# Patient Record
Sex: FEMALE | Race: WHITE | NOT HISPANIC OR LATINO | Employment: UNEMPLOYED | URBAN - METROPOLITAN AREA
[De-identification: names, ages, dates, MRNs, and addresses within clinical notes are randomized per-mention and may not be internally consistent; named-entity substitution may affect disease eponyms.]

---

## 2018-03-09 ENCOUNTER — HOSPITAL ENCOUNTER (INPATIENT)
Facility: HOSPITAL | Age: 18
LOS: 6 days | Discharge: HOME | DRG: 753 | End: 2018-03-15
Attending: PSYCHIATRY & NEUROLOGY | Admitting: PSYCHIATRY & NEUROLOGY
Payer: COMMERCIAL

## 2018-03-09 DIAGNOSIS — F31.32 BIPOLAR AFFECTIVE DISORDER, CURRENTLY DEPRESSED, MODERATE (HCC): ICD-10-CM

## 2018-03-09 DIAGNOSIS — F41.1 GENERALIZED ANXIETY DISORDER: ICD-10-CM

## 2018-03-09 DIAGNOSIS — F31.9 BIPOLAR DISORDER (HCC): Primary | ICD-10-CM

## 2018-03-09 LAB
AMPHETAMINES SERPL QL SCN: NEGATIVE
ATRIAL RATE: 76 BPM
BACTERIA UR QL AUTO: ABNORMAL /HPF
BARBITURATES UR QL: NEGATIVE
BENZODIAZ UR QL: NEGATIVE
BILIRUB UR QL STRIP: NEGATIVE
CLARITY UR: ABNORMAL
COCAINE UR QL: NEGATIVE
COLOR UR: YELLOW
GLUCOSE UR STRIP-MCNC: NEGATIVE MG/DL
HGB UR QL STRIP.AUTO: NEGATIVE
KETONES UR STRIP-MCNC: NEGATIVE MG/DL
LEUKOCYTE ESTERASE UR QL STRIP: ABNORMAL
METHADONE UR QL: NEGATIVE
NITRITE UR QL STRIP: NEGATIVE
NON-SQ EPI CELLS URNS QL MICRO: ABNORMAL /HPF
OPIATES UR QL SCN: NEGATIVE
P AXIS: 23 DEGREES
PCP UR QL: NEGATIVE
PH UR STRIP.AUTO: 6 [PH] (ref 4.5–8)
PR INTERVAL: 114 MS
PROT UR STRIP-MCNC: NEGATIVE MG/DL
QRS AXIS: 70 DEGREES
QRSD INTERVAL: 84 MS
QT INTERVAL: 376 MS
QTC INTERVAL: 423 MS
RBC #/AREA URNS AUTO: ABNORMAL /HPF
SP GR UR STRIP.AUTO: 1.02 (ref 1–1.03)
T WAVE AXIS: 46 DEGREES
THC UR QL: NEGATIVE
UROBILINOGEN UR QL STRIP.AUTO: 0.2 E.U./DL
VENTRICULAR RATE: 76 BPM
WBC #/AREA URNS AUTO: ABNORMAL /HPF

## 2018-03-09 PROCEDURE — 93005 ELECTROCARDIOGRAM TRACING: CPT | Performed by: PSYCHIATRY & NEUROLOGY

## 2018-03-09 PROCEDURE — 81001 URINALYSIS AUTO W/SCOPE: CPT | Performed by: PSYCHIATRY & NEUROLOGY

## 2018-03-09 PROCEDURE — 99254 IP/OBS CNSLTJ NEW/EST MOD 60: CPT | Performed by: PHYSICIAN ASSISTANT

## 2018-03-09 PROCEDURE — 80307 DRUG TEST PRSMV CHEM ANLYZR: CPT | Performed by: PSYCHIATRY & NEUROLOGY

## 2018-03-09 PROCEDURE — 99223 1ST HOSP IP/OBS HIGH 75: CPT | Performed by: PSYCHIATRY & NEUROLOGY

## 2018-03-09 RX ORDER — LITHIUM CARBONATE 600 MG/1
600 CAPSULE ORAL DAILY
Status: ON HOLD | COMMUNITY
End: 2018-03-15

## 2018-03-09 RX ORDER — OLANZAPINE 10 MG/1
5 INJECTION, POWDER, LYOPHILIZED, FOR SOLUTION INTRAMUSCULAR EVERY 8 HOURS PRN
Status: DISCONTINUED | OUTPATIENT
Start: 2018-03-09 | End: 2018-03-15 | Stop reason: HOSPADM

## 2018-03-09 RX ORDER — HALOPERIDOL 5 MG/ML
5 INJECTION INTRAMUSCULAR EVERY 6 HOURS PRN
Status: DISCONTINUED | OUTPATIENT
Start: 2018-03-09 | End: 2018-03-15 | Stop reason: HOSPADM

## 2018-03-09 RX ORDER — LORAZEPAM 2 MG/ML
2 INJECTION INTRAMUSCULAR EVERY 6 HOURS PRN
Status: DISCONTINUED | OUTPATIENT
Start: 2018-03-09 | End: 2018-03-15 | Stop reason: HOSPADM

## 2018-03-09 RX ORDER — HALOPERIDOL 5 MG
5 TABLET ORAL EVERY 8 HOURS PRN
Status: DISCONTINUED | OUTPATIENT
Start: 2018-03-09 | End: 2018-03-15 | Stop reason: HOSPADM

## 2018-03-09 RX ORDER — BUSPIRONE HYDROCHLORIDE 10 MG/1
10 TABLET ORAL 2 TIMES DAILY
COMMUNITY
End: 2018-03-15 | Stop reason: HOSPADM

## 2018-03-09 RX ORDER — LITHIUM CARBONATE 300 MG/1
600 CAPSULE ORAL
Status: DISCONTINUED | OUTPATIENT
Start: 2018-03-09 | End: 2018-03-12

## 2018-03-09 RX ORDER — MAGNESIUM HYDROXIDE/ALUMINUM HYDROXICE/SIMETHICONE 120; 1200; 1200 MG/30ML; MG/30ML; MG/30ML
30 SUSPENSION ORAL EVERY 4 HOURS PRN
Status: DISCONTINUED | OUTPATIENT
Start: 2018-03-09 | End: 2018-03-15 | Stop reason: HOSPADM

## 2018-03-09 RX ORDER — VENLAFAXINE HYDROCHLORIDE 75 MG/1
75 CAPSULE, EXTENDED RELEASE ORAL DAILY
Status: ON HOLD | COMMUNITY
End: 2018-03-15

## 2018-03-09 RX ORDER — BENZTROPINE MESYLATE 1 MG/1
1 TABLET ORAL EVERY 6 HOURS PRN
Status: DISCONTINUED | OUTPATIENT
Start: 2018-03-09 | End: 2018-03-15 | Stop reason: HOSPADM

## 2018-03-09 RX ORDER — DIAPER,BRIEF,INFANT-TODD,DISP
EACH MISCELLANEOUS 4 TIMES DAILY PRN
Status: DISCONTINUED | OUTPATIENT
Start: 2018-03-09 | End: 2018-03-15 | Stop reason: HOSPADM

## 2018-03-09 RX ORDER — OLANZAPINE 5 MG/1
5 TABLET ORAL EVERY 8 HOURS PRN
Status: DISCONTINUED | OUTPATIENT
Start: 2018-03-09 | End: 2018-03-15 | Stop reason: HOSPADM

## 2018-03-09 RX ORDER — VENLAFAXINE HYDROCHLORIDE 37.5 MG/1
37.5 CAPSULE, EXTENDED RELEASE ORAL DAILY
Status: DISCONTINUED | OUTPATIENT
Start: 2018-03-09 | End: 2018-03-10

## 2018-03-09 RX ORDER — HYDROXYZINE 50 MG/1
50 TABLET, FILM COATED ORAL EVERY 8 HOURS PRN
Status: DISCONTINUED | OUTPATIENT
Start: 2018-03-09 | End: 2018-03-15 | Stop reason: HOSPADM

## 2018-03-09 RX ORDER — LORAZEPAM 1 MG/1
1 TABLET ORAL EVERY 8 HOURS PRN
Status: DISCONTINUED | OUTPATIENT
Start: 2018-03-09 | End: 2018-03-15 | Stop reason: HOSPADM

## 2018-03-09 RX ORDER — ACETAMINOPHEN 325 MG/1
650 TABLET ORAL EVERY 6 HOURS PRN
Status: DISCONTINUED | OUTPATIENT
Start: 2018-03-09 | End: 2018-03-15 | Stop reason: HOSPADM

## 2018-03-09 RX ORDER — BENZTROPINE MESYLATE 1 MG/ML
1 INJECTION INTRAMUSCULAR; INTRAVENOUS EVERY 6 HOURS PRN
Status: DISCONTINUED | OUTPATIENT
Start: 2018-03-09 | End: 2018-03-15 | Stop reason: HOSPADM

## 2018-03-09 RX ORDER — ZOLPIDEM TARTRATE 5 MG/1
5 TABLET ORAL
Status: DISCONTINUED | OUTPATIENT
Start: 2018-03-09 | End: 2018-03-15 | Stop reason: HOSPADM

## 2018-03-09 RX ADMIN — VENLAFAXINE HYDROCHLORIDE 37.5 MG: 37.5 CAPSULE, EXTENDED RELEASE ORAL at 12:41

## 2018-03-09 RX ADMIN — HYDROXYZINE HYDROCHLORIDE 50 MG: 50 TABLET, FILM COATED ORAL at 12:44

## 2018-03-09 RX ADMIN — LITHIUM CARBONATE 600 MG: 300 CAPSULE, GELATIN COATED ORAL at 18:00

## 2018-03-09 NOTE — PROGRESS NOTES
Pt complaining of severe itching on her back  Entire back is reddened and irritated with scratch marks noted  Pt states it was from the trazadone overdose  Atarax had been given for anxiety at 1244, did not provide relief from itching  CHRIST Bradford Mediate aware and order for hydrocortisone cream noted  EKG obtained
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
## 2018-03-09 NOTE — TREATMENT PLAN
TREATMENT PLAN REVIEW - [id number] [address] [address] 21 y o  2000 female MRN: [id number]    [address] [place] [address] Room / Bed: [facility] 263/[facility] 546-86 Encounter: [id number]          Admit Date/Time:  3/9/2018 11:02 AM    Treatment Team: Attending Provider: Jody Kawasaki; Medications RN: Riky Burch RN    Diagnosis: Principal Problem:    Bipolar affective disorder, currently depressed, moderate ([address] )  Active Problems:    Generalized anxiety disorder    Patient Strengths/Assets: cooperative, general fund of knowledge, good past treatment response, motivation for treatment/growth, patient is on a voluntary commitment    Patient Barriers/Limitations: low self esteem; non compliance with medications    Short Term Goals: decrease in depressive symptoms, decrease in anxiety symptoms, decrease in suicidal thoughts    Long Term Goals: improvement in depression, improvement in anxiety, stabilization of mood, free of suicidal thoughts, acceptance of need for psychiatric medications, acceptance of need for psychiatric treatment, acceptance of need for psychiatric follow up after discharge    Progress Towards Goals: starting psychiatric medications as prescribed    Recommended Treatment: medication management, patient medication education, group therapy, milieu therapy, continued Behavioral Health psychiatric evaluation/assessment process    Treatment Frequency: daily medication monitoring, group and milieu therapy daily, monitoring through interdisciplinary rounds, monitoring through weekly patient care conferences    Expected Discharge Date: 5-7 days    Discharge Plan: referral for outpatient medication management with a psychiatrist, referral for outpatient psychotherapy    Treatment Plan Created/Updated By: Jody Kawasaki

## 2018-03-09 NOTE — CONSULTS
Consultation - Cayla Cordova 21 y o  female MRN: [id number]    Unit/Bed#: [facility] 263-01 Encounter: [id number]      Assessment/Plan     Assessment/Plan  1  Bipolar disorder - currently depressed   -medically cleared for psychiatric evaluation and treatment  2  Diffuse erythematous rash posterior trunk   -Hydrocortisone cream    History of Present Illness   HPI: Stephanie Coreas is a 21y o  year old female who presents with depression without suicidal ideation or plan  She has a history of self injury with superficial cuts to the wrist  She reports she started with a rash on her back since last week  She denies recent or chronic illness, denies fevers, chills, cp, sob, nausea, vomiting, diarrhea, cough  She does admit to fatigue and reports it is associated with a history of anemia and inability to sleep at night  Inpatient consult for Medical Clearance for [facility] patient  Consult performed by: Kelsy Wong  Consult ordered by: Rc Bustillos          Review of Systems   Constitutional: Positive for chills and fatigue (with hx anemia)  HENT: Negative  Eyes: Negative  Respiratory: Negative  Cardiovascular: Negative  Gastrointestinal: Negative  Genitourinary: Negative  Musculoskeletal: Negative  Skin: Negative  Allergic/Immunologic: Negative  Neurological: Negative  Hematological: Negative  Psychiatric/Behavioral: Positive for dysphoric mood and self-injury  The patient is nervous/anxious          Historical Information   Past Medical History:   Diagnosis Date    Anxiety     Bipolar disorder ([facility] [address] )     Borderline personality disorder     Depression     Panic attack     Psychiatric illness     Many hospitalizaations [address] psych    PTSD (post-traumatic stress disorder)     Self-injurious behavior     Cuts self on arms and upper thighs  last time 2 weeks ago    Sleep difficulties     sleeps very little in day and night time    Suicide attempt      No past surgical history on file  Social History   History   Alcohol use Not on file     History   Drug use: Unknown     History   Smoking Status    Not on file   Smokeless Tobacco    Not on file     Family History: non-contributory    Meds/Allergies   PTA meds:   Prior to Admission Medications   Prescriptions Last Dose Informant Patient Reported? Taking?   busPIRone (BUSPAR) 10 mg tablet  Self Yes Yes   Sig: Take 10 mg by mouth 2 (two) times a day   lithium 600 MG capsule  Self Yes Yes   Sig: Take 600 mg by mouth daily   venlafaxine (EFFEXOR-XR) 75 mg 24 hr capsule  Self Yes Yes   Sig: Take 75 mg by mouth daily      Facility-Administered Medications: None     Allergies no known allergies    Objective   Vitals: Blood pressure 121/79, pulse 91, temperature (!) 97 4 °F (36 3 °C), temperature source Temporal, resp  rate 18, height 5' 1" (1 549 m), weight 45 4 kg (100 lb 1 6 oz), SpO2 98 %  No intake or output data in the 24 hours ending 03/09/18 1410  Invasive Devices          No matching active lines, drains, or airways          Physical Exam   Constitutional: She is oriented to person, place, and time  She appears well-developed and well-nourished  HENT:   Head: Normocephalic and atraumatic  Eyes: EOM are normal  Pupils are equal, round, and reactive to light  Neck: Normal range of motion  Cardiovascular: Normal rate and regular rhythm  Exam reveals no gallop and no friction rub  No murmur heard  Pulmonary/Chest: Effort normal  She has no wheezes  She has no rales  Abdominal: Soft  There is no tenderness  There is no guarding  Neurological: She is alert and oriented to person, place, and time  Skin: Skin is warm  There is erythema (back, with scratch marks present)           Code Status: Level 1 - Full Code
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
## 2018-03-09 NOTE — CASE MANAGEMENT
CM spoke to pt's mother Troy Avitia by telephone ([phone]) who advised CM that pt has had numerous hospitalizations  Hortensia Monteiro was unable to provide a contact number for Evelyn Rao and [facility] who she is presently living with  Hortensia Monteiro mentioned that pt was enrolled in [facility] in [place] but did not feel comfortable there as she felt like she was being bullied  CM will continue to follow

## 2018-03-10 LAB
ALBUMIN SERPL BCP-MCNC: 2.7 G/DL (ref 3.5–5)
ALP SERPL-CCNC: 40 U/L (ref 46–384)
ALT SERPL W P-5'-P-CCNC: 17 U/L (ref 12–78)
ANION GAP SERPL CALCULATED.3IONS-SCNC: 6 MMOL/L (ref 4–13)
AST SERPL W P-5'-P-CCNC: 15 U/L (ref 5–45)
BASOPHILS # BLD AUTO: 0.04 THOUSANDS/ΜL (ref 0–0.1)
BASOPHILS NFR BLD AUTO: 1 % (ref 0–1)
BILIRUB SERPL-MCNC: 0.2 MG/DL (ref 0.2–1)
BUN SERPL-MCNC: 13 MG/DL (ref 5–25)
CALCIUM SERPL-MCNC: 7.5 MG/DL (ref 8.3–10.1)
CHLORIDE SERPL-SCNC: 106 MMOL/L (ref 100–108)
CHOLEST SERPL-MCNC: 153 MG/DL (ref 50–200)
CO2 SERPL-SCNC: 28 MMOL/L (ref 21–32)
CREAT SERPL-MCNC: 0.78 MG/DL (ref 0.6–1.3)
EOSINOPHIL # BLD AUTO: 0.19 THOUSAND/ΜL (ref 0–0.61)
EOSINOPHIL NFR BLD AUTO: 3 % (ref 0–6)
ERYTHROCYTE [DISTWIDTH] IN BLOOD BY AUTOMATED COUNT: 12.4 % (ref 11.6–15.1)
GFR SERPL CREATININE-BSD FRML MDRD: 111 ML/MIN/1.73SQ M
GLUCOSE P FAST SERPL-MCNC: 88 MG/DL (ref 65–99)
GLUCOSE SERPL-MCNC: 88 MG/DL (ref 65–140)
HCG SERPL QL: NEGATIVE
HCT VFR BLD AUTO: 37 % (ref 34.8–46.1)
HDLC SERPL-MCNC: 53 MG/DL (ref 40–60)
HGB BLD-MCNC: 12.1 G/DL (ref 11.5–15.4)
LDLC SERPL CALC-MCNC: 88 MG/DL (ref 0–100)
LYMPHOCYTES # BLD AUTO: 2.25 THOUSANDS/ΜL (ref 0.6–4.47)
LYMPHOCYTES NFR BLD AUTO: 29 % (ref 14–44)
MCH RBC QN AUTO: 31.1 PG (ref 26.8–34.3)
MCHC RBC AUTO-ENTMCNC: 32.7 G/DL (ref 31.4–37.4)
MCV RBC AUTO: 95 FL (ref 82–98)
MONOCYTES # BLD AUTO: 0.7 THOUSAND/ΜL (ref 0.17–1.22)
MONOCYTES NFR BLD AUTO: 9 % (ref 4–12)
NEUTROPHILS # BLD AUTO: 4.47 THOUSANDS/ΜL (ref 1.85–7.62)
NEUTS SEG NFR BLD AUTO: 58 % (ref 43–75)
PLATELET # BLD AUTO: 270 THOUSANDS/UL (ref 149–390)
PMV BLD AUTO: 9.2 FL (ref 8.9–12.7)
POTASSIUM SERPL-SCNC: 4.1 MMOL/L (ref 3.5–5.3)
PROT SERPL-MCNC: 5.5 G/DL (ref 6.4–8.2)
RBC # BLD AUTO: 3.89 MILLION/UL (ref 3.81–5.12)
SODIUM SERPL-SCNC: 140 MMOL/L (ref 136–145)
TRIGL SERPL-MCNC: 61 MG/DL
TSH SERPL DL<=0.05 MIU/L-ACNC: 2.6 UIU/ML (ref 0.46–3.98)
WBC # BLD AUTO: 7.65 THOUSAND/UL (ref 4.31–10.16)

## 2018-03-10 PROCEDURE — 86592 SYPHILIS TEST NON-TREP QUAL: CPT | Performed by: PSYCHIATRY & NEUROLOGY

## 2018-03-10 PROCEDURE — 84443 ASSAY THYROID STIM HORMONE: CPT | Performed by: PSYCHIATRY & NEUROLOGY

## 2018-03-10 PROCEDURE — 80061 LIPID PANEL: CPT | Performed by: PSYCHIATRY & NEUROLOGY

## 2018-03-10 PROCEDURE — 80053 COMPREHEN METABOLIC PANEL: CPT | Performed by: PSYCHIATRY & NEUROLOGY

## 2018-03-10 PROCEDURE — 85025 COMPLETE CBC W/AUTO DIFF WBC: CPT | Performed by: PSYCHIATRY & NEUROLOGY

## 2018-03-10 PROCEDURE — 84703 CHORIONIC GONADOTROPIN ASSAY: CPT | Performed by: PSYCHIATRY & NEUROLOGY

## 2018-03-10 PROCEDURE — 99232 SBSQ HOSP IP/OBS MODERATE 35: CPT | Performed by: PSYCHIATRY & NEUROLOGY

## 2018-03-10 RX ORDER — VENLAFAXINE HYDROCHLORIDE 75 MG/1
75 CAPSULE, EXTENDED RELEASE ORAL DAILY
Status: DISCONTINUED | OUTPATIENT
Start: 2018-03-11 | End: 2018-03-15 | Stop reason: HOSPADM

## 2018-03-10 RX ADMIN — LITHIUM CARBONATE 600 MG: 300 CAPSULE, GELATIN COATED ORAL at 17:24

## 2018-03-10 RX ADMIN — VENLAFAXINE HYDROCHLORIDE 37.5 MG: 37.5 CAPSULE, EXTENDED RELEASE ORAL at 09:31

## 2018-03-10 RX ADMIN — HYDROCORTISONE 1 APPLICATION: 1 CREAM TOPICAL at 14:09

## 2018-03-10 NOTE — PROGRESS NOTES
Generally seclusive to room & difficult to engage upon approach  Did attempt to attend group, but left before it was over  Denies SI/HI & A/V Hallucinations  Rates her depression at a 2 & anxiety at a 4  Denies urges to cut  Denies questions/concerns when asked  Did go to group, but was unable to stay for the duration  No other changes in status

## 2018-03-10 NOTE — PROGRESS NOTES
Progress Note - Behavioral Health   Chari Cordova 21 y o  female MRN: [id number]  Unit/Bed#: [facility] 263-01 Encounter: [id number]    Assessment/Plan   Principal Problem:    Bipolar affective disorder, currently depressed, moderate ([address] )  Active Problems:    Generalized anxiety disorder      Subjective: Patient remained complaint with medications  Remained depressed with anger and suicidal ideations  She is consenting for safety on unit  No physical symptoms reported  Patient is interested in partial outpatient program and DBT on discharge  She is seen out in milieu attending groups  Current Medications:    Current Facility-Administered Medications:  acetaminophen 650 mg Oral Q6H PRN Conner Feldman   aluminum-magnesium hydroxide-simethicone 30 mL Oral Q4H PRN Conner Feldman   benztropine 1 mg Intramuscular Q6H PRN Conner Feldman   benztropine 1 mg Oral Q6H PRN Conner Feldman   haloperidol 5 mg Oral Q8H PRN Conner Feldman   haloperidol lactate 5 mg Intramuscular Q6H PRN Conner Feldman   hydrocortisone  Topical 4x Daily PRN Lorenza Ramirez PA-C   hydrOXYzine HCL 50 mg Oral Q8H PRN Conner Feldman   lithium carbonate 600 mg Oral After [employer]   LORazepam 2 mg Intramuscular Q6H PRN Conner Feldman   LORazepam 1 mg Oral Q8H PRN Conner Feldman   magnesium hydroxide 30 mL Oral Daily PRN Conner Feldman   OLANZapine 5 mg Intramuscular Q8H PRN Conner Feldman   OLANZapine 5 mg Oral Q8H PRN Conner Feldman   [START ON 3/11/2018] venlafaxine 75 mg Oral Daily Conner Feldman   zolpidem 5 mg Oral HS PRN Conner Feldman       Behavioral Health Medications: all current active meds have been reviewed  Vital signs in last 24 hours:  Temp:  [97 4 °F (36 3 °C)-98 9 °F (37 2 °C)] 98 4 °F (36 9 °C)  HR:  [78-91] 78  Resp:  [15-18] 15  BP: (101-121)/(51-79) 101/51    Laboratory results:    I have personally reviewed all pertinent laboratory/tests results    Labs in last 72 hours:   Recent Labs 03/10/18   0542   WBC  7 65   RBC  3 89   HGB  12 1   HCT  37 0   PLT  270   RDW  12 4   NEUTROABS  4 47   NA  140   K  4 1   CL  106   CO2  28   BUN  13   CREATININE  0 78   GLUCOSE  88   CALCIUM  7 5*   AST  15   ALT  17   ALKPHOS  40*   PROT  5 5*   BILITOT  0 20   CHOL  153   HDL  53   TRIG  61   LDLCALC  88   [id number]  2 601   PREGSERUM  Negative     Admission Labs:   Admission on 03/09/2018   Component Date Value    Color, UA 03/09/2018 Yellow     Clarity, UA 03/09/2018 Slightly Cloudy     Specific Gravity, UA 03/09/2018 1 025     pH, UA 03/09/2018 6 0     Leukocytes, UA 03/09/2018 Trace*    Nitrite, UA 03/09/2018 Negative     Protein, UA 03/09/2018 Negative     Glucose, UA 03/09/2018 Negative     Ketones, UA 03/09/2018 Negative     Urobilinogen, UA 03/09/2018 0 2     Bilirubin, UA 03/09/2018 Negative     Blood, UA 03/09/2018 Negative     Amph/Meth UR 03/09/2018 Negative     Barbiturate Ur 03/09/2018 Negative     Benzodiazepine Urine 03/09/2018 Negative     Cocaine Urine 03/09/2018 Negative     Methadone Urine 03/09/2018 Negative     Opiate Urine 03/09/2018 Negative     PCP Ur 03/09/2018 Negative     THC Urine 03/09/2018 Negative     Ventricular Rate 03/09/2018 76     Atrial Rate 03/09/2018 76     [place] Interval 03/09/2018 114     QRSD Interval 03/09/2018 84     QT Interval 03/09/2018 376     QTC Interval 03/09/2018 423     P Axis 03/09/2018 23     QRS Axis 03/09/2018 70     T Wave Axis 03/09/2018 46     RBC, UA 03/09/2018 2-4*    WBC, UA 03/09/2018 4-10*    Epithelial Cells 03/09/2018 Moderate*    Bacteria, UA 03/09/2018 Moderate*    TSH 3RD GENERATON 03/10/2018 2 601     Cholesterol 03/10/2018 153     Triglycerides 03/10/2018 61     HDL, Direct 03/10/2018 53     LDL Calculated 03/10/2018 88     Preg, Serum 03/10/2018 Negative     Sodium 03/10/2018 140     Potassium 03/10/2018 4 1     Chloride 03/10/2018 106     CO2 03/10/2018 28     Anion Gap 03/10/2018 6     BUN 03/10/2018 13     Creatinine 03/10/2018 0 78     Glucose 03/10/2018 88     Glucose, Fasting 03/10/2018 88     Calcium 03/10/2018 7 5*    AST 03/10/2018 15     ALT 03/10/2018 17     Alkaline Phosphatase 03/10/2018 40*    Total Protein 03/10/2018 5 5*    Albumin 03/10/2018 2 7*    Total Bilirubin 03/10/2018 0 20     eGFR 03/10/2018 111     WBC 03/10/2018 7 65     RBC 03/10/2018 3 89     Hemoglobin 03/10/2018 12 1     Hematocrit 03/10/2018 37 0     MCV 03/10/2018 95     MCH 03/10/2018 31 1     MCHC 03/10/2018 32 7     RDW 03/10/2018 12 4     MPV 03/10/2018 9 2     Platelets 77/80/0013 270     Neutrophils Relative 03/10/2018 58     Lymphocytes Relative 03/10/2018 29     Monocytes Relative 03/10/2018 9     Eosinophils Relative 03/10/2018 3     Basophils Relative 03/10/2018 1     Neutrophils Absolute 03/10/2018 4 47     Lymphocytes Absolute 03/10/2018 2 25     Monocytes Absolute 03/10/2018 0 70     Eosinophils Absolute 03/10/2018 0 19     Basophils Absolute 03/10/2018 0 04        Psychiatric Review of Systems:  Behavior over the last 24 hours:  Slow improvement  Sleep: normal  Appetite: normal  Medication side effects: No  ROS: no complaints    Mental Status Evaluation:  Appearance:  casually dressed   Behavior:  guarded   Speech:  soft   Mood:  anxious and depressed   Affect:  constricted   Language naming objects and repeating phrases   Thought Process:  normal   Thought Content:  obsessions   Perceptual Disturbances: None   Risk Potential: Suicidal Ideations without plan, Homicidal Ideations none and Potential for Aggression No   Sensorium:  person and place   Cognition:  grossly intact   Consciousness:  awake    Attention: attention span appeared shorter than expected for age   Insight:  limited   Judgment: limited   Intellect fair   Gait/Station: normal gait/station   Motor Activity: no abnormal movements     Progress Toward Goals: slow progress    Recommended Treatment:   Increase Effexor XR 75 mg daily for depression  Continue with group therapy, milieu therapy and occupational therapy  Continue following current medications:   Current Facility-Administered Medications:  acetaminophen 650 mg Oral Q6H PRN Conner Feldman   aluminum-magnesium hydroxide-simethicone 30 mL Oral Q4H PRN Conner Feldman   benztropine 1 mg Intramuscular Q6H PRN Conner Feldman   benztropine 1 mg Oral Q6H PRN Conner Feldman   haloperidol 5 mg Oral Q8H PRN Conner Feldman   haloperidol lactate 5 mg Intramuscular Q6H PRN Conner Feldman   hydrocortisone  Topical 4x Daily PRN Christine Hernandez PA-C   hydrOXYzine HCL 50 mg Oral Q8H PRN Conner Feldman   lithium carbonate 600 mg Oral After [employer]   LORazepam 2 mg Intramuscular Q6H PRN Conner Feldman   LORazepam 1 mg Oral Q8H PRN Conner Feldman   magnesium hydroxide 30 mL Oral Daily PRN Conner Feldman   OLANZapine 5 mg Intramuscular Q8H PRN Conner Feldman   OLANZapine 5 mg Oral Q8H PRN Conner Feldman   [START ON 3/11/2018] venlafaxine 75 mg Oral Daily Conner Feldman   zolpidem 5 mg Oral HS PRN Conner Feldman       Risks, benefits and possible side effects of Medications:   Risks, benefits, and possible side effects of medications explained to patient and patient verbalizes understanding  Risks of medications in pregnancy explained if female patient  Patient verbalizes understanding and agrees to notify her doctor if she becomes pregnant  This note has been constructed using a voice recognition system  There may be translation, syntax,  or grammatical errors  If you have any questions, please contact the dictating provider

## 2018-03-10 NOTE — PROGRESS NOTES
Pt denies feeling depressed or anxious at this time and denies SI or thoughts to self harm, states she is just bored  Pt has flat red rash on R thigh and back of R knee, no rash observed on back, pt receiving PRN hydrocortisone  Pt is interested in learning more about her diagnoses but states she has received nine of them  Pt also discussed her career options at length and would like to work in the mental health field  Pt states she is trying to socialize with her peers but they are mostly "doing their own thing" and do not talk much with her  Out in milieu, attending groups, cooperative

## 2018-03-10 NOTE — PROGRESS NOTES
Pt continues to deny symptoms or thoughts of self-harm, states she is just bored  Social with select peers before returning to room to sleep  Pleasant in conversation, cooperative  During education on lithium, pt stated she had symptoms of lithium toxicity a couple weeks ago and had her level tested but was never told what it was, denies symptoms currently

## 2018-03-11 PROCEDURE — 99232 SBSQ HOSP IP/OBS MODERATE 35: CPT | Performed by: PSYCHIATRY & NEUROLOGY

## 2018-03-11 RX ORDER — DIPHENHYDRAMINE HCL 25 MG
25 TABLET ORAL EVERY 6 HOURS PRN
Status: DISCONTINUED | OUTPATIENT
Start: 2018-03-11 | End: 2018-03-15 | Stop reason: HOSPADM

## 2018-03-11 RX ADMIN — LITHIUM CARBONATE 600 MG: 300 CAPSULE, GELATIN COATED ORAL at 17:51

## 2018-03-11 RX ADMIN — VENLAFAXINE HYDROCHLORIDE 75 MG: 75 CAPSULE, EXTENDED RELEASE ORAL at 09:11

## 2018-03-11 RX ADMIN — ACETAMINOPHEN 650 MG: 325 TABLET, FILM COATED ORAL at 09:11

## 2018-03-11 RX ADMIN — HYDROXYZINE HYDROCHLORIDE 50 MG: 50 TABLET, FILM COATED ORAL at 16:40

## 2018-03-11 RX ADMIN — HYDROCORTISONE 1 APPLICATION: 1 CREAM TOPICAL at 16:38

## 2018-03-11 NOTE — PROGRESS NOTES
Progress Note - Behavioral Health   Chari Cordova 21 y o  female MRN: [id number]  Unit/Bed#: [facility] 263-01 Encounter: [id number]    Assessment/Plan   Principal Problem:    Bipolar affective disorder, currently depressed, moderate ([address] )  Active Problems:    Generalized anxiety disorder      Subjective:  Patient remained compliant with medication with no acute side effects  Her rash is resolving and is no longer itching in nature  She reports reduction in irritability and impulsive thoughts  Reduction suicidal ideations noted  Agreed with checking lithium  Discussed the option of considering step-down to partial outpatient follow-up once stable  Patient is in agreement with this planning if approved by insurance  Current Medications:    Current Facility-Administered Medications:  acetaminophen 650 mg Oral Q6H PRN Conner Feldman   aluminum-magnesium hydroxide-simethicone 30 mL Oral Q4H PRN Conner Feldman   benztropine 1 mg Intramuscular Q6H PRN Conner Feldman   benztropine 1 mg Oral Q6H PRN Conner Feldman   haloperidol 5 mg Oral Q8H PRN Conner Feldman   haloperidol lactate 5 mg Intramuscular Q6H PRN Conner Feldman   hydrocortisone  Topical 4x Daily PRN Ada Adames PA-C   hydrOXYzine HCL 50 mg Oral Q8H PRN Conner Feldman   lithium carbonate 600 mg Oral After [employer]   LORazepam 2 mg Intramuscular Q6H PRN Conner Feldman   LORazepam 1 mg Oral Q8H PRN Conner Feldman   magnesium hydroxide 30 mL Oral Daily PRN Conner Feldman   OLANZapine 5 mg Intramuscular Q8H PRN Conner Feldman   OLANZapine 5 mg Oral Q8H PRN Conner Feldman   venlafaxine 75 mg Oral Daily Conner Feldman   zolpidem 5 mg Oral HS PRN Conner Feldman       Behavioral Health Medications: all current active meds have been reviewed      Vital signs in last 24 hours:  Temp:  [97 2 °F (36 2 °C)] 97 2 °F (36 2 °C)  HR:  [77] 77  Resp:  [18] 18  BP: (115)/(71) 115/71    Laboratory results:    I have personally reviewed all pertinent laboratory/tests results    Labs in last 72 hours:   Recent Labs      03/10/18   0542   WBC  7 65   RBC  3 89   HGB  12 1   HCT  37 0   PLT  270   RDW  12 4   NEUTROABS  4 47   NA  140   K  4 1   CL  106   CO2  28   BUN  13   CREATININE  0 78   GLUCOSE  88   CALCIUM  7 5*   AST  15   ALT  17   ALKPHOS  40*   PROT  5 5*   BILITOT  0 20   CHOL  153   HDL  53   TRIG  61   LDLCALC  88   [id number]  2 601   PREGSERUM  Negative     Admission Labs:   Admission on 03/09/2018   Component Date Value    Color, UA 03/09/2018 Yellow     Clarity, UA 03/09/2018 Slightly Cloudy     Specific Gravity, UA 03/09/2018 1 025     pH, UA 03/09/2018 6 0     Leukocytes, UA 03/09/2018 Trace*    Nitrite, UA 03/09/2018 Negative     Protein, UA 03/09/2018 Negative     Glucose, UA 03/09/2018 Negative     Ketones, UA 03/09/2018 Negative     Urobilinogen, UA 03/09/2018 0 2     Bilirubin, UA 03/09/2018 Negative     Blood, UA 03/09/2018 Negative     Amph/Meth UR 03/09/2018 Negative     Barbiturate Ur 03/09/2018 Negative     Benzodiazepine Urine 03/09/2018 Negative     Cocaine Urine 03/09/2018 Negative     Methadone Urine 03/09/2018 Negative     Opiate Urine 03/09/2018 Negative     PCP Ur 03/09/2018 Negative     THC Urine 03/09/2018 Negative     Ventricular Rate 03/09/2018 76     Atrial Rate 03/09/2018 76     [place] Interval 03/09/2018 114     QRSD Interval 03/09/2018 84     QT Interval 03/09/2018 376     QTC Interval 03/09/2018 423     P Axis 03/09/2018 23     QRS Axis 03/09/2018 70     T Wave Axis 03/09/2018 46     RBC, UA 03/09/2018 2-4*    WBC, UA 03/09/2018 4-10*    Epithelial Cells 03/09/2018 Moderate*    Bacteria, UA 03/09/2018 Moderate*    TSH 3RD GENERATON 03/10/2018 2 601     Cholesterol 03/10/2018 153     Triglycerides 03/10/2018 61     HDL, Direct 03/10/2018 53     LDL Calculated 03/10/2018 88     Preg, Serum 03/10/2018 Negative     Sodium 03/10/2018 140     Potassium 03/10/2018 4 1     Chloride 03/10/2018 106     CO2 03/10/2018 28     Anion Gap 03/10/2018 6     BUN 03/10/2018 13     Creatinine 03/10/2018 0 78     Glucose 03/10/2018 88     Glucose, Fasting 03/10/2018 88     Calcium 03/10/2018 7 5*    AST 03/10/2018 15     ALT 03/10/2018 17     Alkaline Phosphatase 03/10/2018 40*    Total Protein 03/10/2018 5 5*    Albumin 03/10/2018 2 7*    Total Bilirubin 03/10/2018 0 20     eGFR 03/10/2018 111     WBC 03/10/2018 7 65     RBC 03/10/2018 3 89     Hemoglobin 03/10/2018 12 1     Hematocrit 03/10/2018 37 0     MCV 03/10/2018 95     MCH 03/10/2018 31 1     MCHC 03/10/2018 32 7     RDW 03/10/2018 12 4     MPV 03/10/2018 9 2     Platelets 40/10/6987 270     Neutrophils Relative 03/10/2018 58     Lymphocytes Relative 03/10/2018 29     Monocytes Relative 03/10/2018 9     Eosinophils Relative 03/10/2018 3     Basophils Relative 03/10/2018 1     Neutrophils Absolute 03/10/2018 4 47     Lymphocytes Absolute 03/10/2018 2 25     Monocytes Absolute 03/10/2018 0 70     Eosinophils Absolute 03/10/2018 0 19     Basophils Absolute 03/10/2018 0 04        Psychiatric Review of Systems:  Behavior over the last 24 hours:  Slow improvement  Sleep: normal  Appetite: normal  Medication side effects: No  ROS: no complaints    Mental Status Evaluation:  Appearance:  casually dressed   Behavior:  guarded   Speech:  soft   Mood:  anxious and depressed   Affect:  constricted   Language naming objects and repeating phrases   Thought Process:  circumstantial   Thought Content:  obsessions   Perceptual Disturbances: None   Risk Potential: Suicidal Ideations without plan, Homicidal Ideations none and Potential for Aggression No   Sensorium:  person and time/date   Cognition:  grossly intact   Consciousness:  awake    Attention: attention span appeared shorter than expected for age   Insight:  limited   Judgment: limited   Intellect fair   Gait/Station: normal gait/station   Motor Activity: no abnormal movements     Progress Toward Goals: slow progress    Recommended Treatment:   Lithium level tomorrow AM   Continue with group therapy, milieu therapy and occupational therapy  Continue following current medications:   Current Facility-Administered Medications:  acetaminophen 650 mg Oral Q6H PRN Conner Feldman   aluminum-magnesium hydroxide-simethicone 30 mL Oral Q4H PRN Conner Feldman   benztropine 1 mg Intramuscular Q6H PRN Conner Feldman   benztropine 1 mg Oral Q6H PRN Conner Feldman   haloperidol 5 mg Oral Q8H PRN Conner Feldman   haloperidol lactate 5 mg Intramuscular Q6H PRN Conner Feldman   hydrocortisone  Topical 4x Daily PRN Jessica Mohr PA-C   hydrOXYzine HCL 50 mg Oral Q8H PRN Conner Feldman   lithium carbonate 600 mg Oral After [employer]   LORazepam 2 mg Intramuscular Q6H PRN Conner Feldman   LORazepam 1 mg Oral Q8H PRN Conner Feldman   magnesium hydroxide 30 mL Oral Daily PRN Conner Feldman   OLANZapine 5 mg Intramuscular Q8H PRN Conner Feldman   OLANZapine 5 mg Oral Q8H PRN Conner Feldamn   venlafaxine 75 mg Oral Daily Conner Feldman   zolpidem 5 mg Oral HS PRN Conner Feldman       Risks, benefits and possible side effects of Medications:   Risks, benefits, and possible side effects of medications explained to patient and patient verbalizes understanding  Risks of medications in pregnancy explained if female patient  Patient verbalizes understanding and agrees to notify her doctor if she becomes pregnant  This note has been constructed using a voice recognition system  There may be translation, syntax,  or grammatical errors  If you have any questions, please contact the dictating provider
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
## 2018-03-12 LAB
ALBUMIN SERPL BCP-MCNC: 2.8 G/DL (ref 3.5–5)
ALP SERPL-CCNC: 44 U/L (ref 46–384)
ALT SERPL W P-5'-P-CCNC: 17 U/L (ref 12–78)
ANION GAP SERPL CALCULATED.3IONS-SCNC: 9 MMOL/L (ref 4–13)
AST SERPL W P-5'-P-CCNC: 18 U/L (ref 5–45)
BILIRUB SERPL-MCNC: 0.1 MG/DL (ref 0.2–1)
BUN SERPL-MCNC: 14 MG/DL (ref 5–25)
CALCIUM SERPL-MCNC: 8.4 MG/DL (ref 8.3–10.1)
CHLORIDE SERPL-SCNC: 104 MMOL/L (ref 100–108)
CO2 SERPL-SCNC: 26 MMOL/L (ref 21–32)
CREAT SERPL-MCNC: 0.79 MG/DL (ref 0.6–1.3)
GFR SERPL CREATININE-BSD FRML MDRD: 109 ML/MIN/1.73SQ M
GLUCOSE P FAST SERPL-MCNC: 77 MG/DL (ref 65–99)
GLUCOSE SERPL-MCNC: 77 MG/DL (ref 65–140)
LITHIUM SERPL-SCNC: 0.5 MMOL/L (ref 0.5–1)
POTASSIUM SERPL-SCNC: 3.7 MMOL/L (ref 3.5–5.3)
PROT SERPL-MCNC: 5.8 G/DL (ref 6.4–8.2)
RPR SER QL: NORMAL
SODIUM SERPL-SCNC: 139 MMOL/L (ref 136–145)

## 2018-03-12 PROCEDURE — 99232 SBSQ HOSP IP/OBS MODERATE 35: CPT | Performed by: PSYCHIATRY & NEUROLOGY

## 2018-03-12 PROCEDURE — 80178 ASSAY OF LITHIUM: CPT | Performed by: PSYCHIATRY & NEUROLOGY

## 2018-03-12 PROCEDURE — 80053 COMPREHEN METABOLIC PANEL: CPT | Performed by: PSYCHIATRY & NEUROLOGY

## 2018-03-12 RX ADMIN — LITHIUM CARBONATE 750 MG: 300 CAPSULE, GELATIN COATED ORAL at 17:37

## 2018-03-12 RX ADMIN — DIPHENHYDRAMINE HCL 25 MG: 25 TABLET ORAL at 19:21

## 2018-03-12 RX ADMIN — VENLAFAXINE HYDROCHLORIDE 75 MG: 75 CAPSULE, EXTENDED RELEASE ORAL at 09:05

## 2018-03-12 NOTE — PROGRESS NOTES
Pt denies anxiety, depression, S/H/I  States she didn't sleep well and does not like to take sleep meds because they give her nightmares  States that rash is gone as well as tongue edema  Pt shared that she plans to get her 's license back as it was suspended, because, she states "she was being stupid " Pt did not wish to elaborate  She also shared that she would like to get a job at [employer] because they pay well  Pt has been compliant with medications and attends groups  Will continue to monitor

## 2018-03-12 NOTE — PROGRESS NOTES
Pt denies depression, anxiety and SI on this shift, states she feels manic and gets irritated out of nowhere but will be happy a few minutes later  Appropriate affect, normal speech  Pt c/o rash and itching at 1630, redness and hives observed on arms bilaterally and on L lower leg, itching on back but no rash observed  Pt received PRN hydrocortisone and Atarax which were effective, mild redness without hives observed on R arm, reduced itching  Pt reports that rash began one week ago after she discontinued Buspar, Trazodone and Wellbutrin and reports that cold and hot sweats and nightmares began at the same time, pt reports her rash comes and goes and seems to be getting worse each time it comes back  Pt also reported that her tongue was swollen and painful at 1930, also reports taste of blood in her mouth but denies biting her tongue, denies difficulty breathing, tongue appeared mildly swollen  NP Tiffanie Bernal was notified and ordered PRN Benadryl and Magic Mouthwash if needed though pt reports tongue swelling was improved at 2000 and continued to improve at 2100  Out in milieu, social with peers, cooperative

## 2018-03-12 NOTE — PROGRESS NOTES
Progress Note - Behavioral Health   Chari Cordova 21 y o  female MRN: [id number]  Unit/Bed#: [facility] 263-01 Encounter: [id number]    Assessment/Plan   Principal Problem:    Bipolar affective disorder, currently depressed, moderate (HCC)  Active Problems:    Generalized anxiety disorder      Subjective: Patient is compliant with medication but continued to express irritability and passive death wishes  She does report slow improvement in mood after introduction of lithium and Effexor  Her lithium level is 0 5 today  She agreed with increase in lithium for additional benefit with mood stabilization  She is consenting for safety on the unit and agreed with plan of step-down to partial outpatient follow-up, if approved by insurance  Current Medications:    Current Facility-Administered Medications:  acetaminophen 650 mg Oral Q6H PRN Conner Feldman   al mag oxide-diphenhydramine-lidocaine viscous 10 mL Swish & Swallow Q6H PRN JAYLEEN Rayo   aluminum-magnesium hydroxide-simethicone 30 mL Oral Q4H PRN Conner Feldman   benztropine 1 mg Intramuscular Q6H PRN Conner Feldman   benztropine 1 mg Oral Q6H PRN Conner Feldman   diphenhydrAMINE 25 mg Oral Q6H PRN JAYLEEN Sanders   haloperidol 5 mg Oral Q8H PRN Conner Feldman   haloperidol lactate 5 mg Intramuscular Q6H PRN Conner Feldman   hydrocortisone  Topical 4x Daily PRN Lilian Williamson PA-C   hydrOXYzine HCL 50 mg Oral Q8H PRN Conner Feldman   lithium carbonate 750 mg Oral After [employer]   LORazepam 2 mg Intramuscular Q6H PRN Conner Feldman   LORazepam 1 mg Oral Q8H PRN Conner Feldman   magnesium hydroxide 30 mL Oral Daily PRN Conner Feldman   OLANZapine 5 mg Intramuscular Q8H PRN Conner Feldman   OLANZapine 5 mg Oral Q8H PRN Conner Feldman   venlafaxine 75 mg Oral Daily Conner Feldman   zolpidem 5 mg Oral HS PRN Conner Feldman       Behavioral Health Medications: all current active meds have been reviewed      Vital signs in last 24 hours:  Temp:  [97 3 °F (36 3 °C)-97 9 °F (36 6 °C)] 97 9 °F (36 6 °C)  HR:  [77-80] 80  Resp:  [16-18] 18  BP: (112-124)/(60-64) 124/60    Laboratory results:    I have personally reviewed all pertinent laboratory/tests results    Labs in last 72 hours:   Recent Labs      03/10/18   0542  03/12/18   0556   WBC  7 65   --    RBC  3 89   --    HGB  12 1   --    HCT  37 0   --    PLT  270   --    RDW  12 4   --    NEUTROABS  4 47   --    NA  140  139   K  4 1  3 7   CL  106  104   CO2  28  26   BUN  13  14   CREATININE  0 78  0 79   GLUCOSE  88  77   CALCIUM  7 5*  8 4   AST  15  18   ALT  17  17   ALKPHOS  40*  44*   PROT  5 5*  5 8*   BILITOT  0 20  0 10*   CHOL  153   --    HDL  53   --    TRIG  61   --    LDLCALC  88   --    LITHIUM   --   0 5   [id number]  2 601   --    PREGSERUM  Negative   --    RPR  Non-Reactive   --      Admission Labs:   Admission on 03/09/2018   Component Date Value    Color, UA 03/09/2018 Yellow     Clarity, UA 03/09/2018 Slightly Cloudy     Specific Gravity, UA 03/09/2018 1 025     pH, UA 03/09/2018 6 0     Leukocytes, UA 03/09/2018 Trace*    Nitrite, UA 03/09/2018 Negative     Protein, UA 03/09/2018 Negative     Glucose, UA 03/09/2018 Negative     Ketones, UA 03/09/2018 Negative     Urobilinogen, UA 03/09/2018 0 2     Bilirubin, UA 03/09/2018 Negative     Blood, UA 03/09/2018 Negative     Amph/Meth UR 03/09/2018 Negative     Barbiturate Ur 03/09/2018 Negative     Benzodiazepine Urine 03/09/2018 Negative     Cocaine Urine 03/09/2018 Negative     Methadone Urine 03/09/2018 Negative     Opiate Urine 03/09/2018 Negative     PCP Ur 03/09/2018 Negative     THC Urine 03/09/2018 Negative     Ventricular Rate 03/09/2018 76     Atrial Rate 03/09/2018 76     WY Interval 03/09/2018 114     QRSD Interval 03/09/2018 84     QT Interval 03/09/2018 376     QTC Interval 03/09/2018 423     P Axis 03/09/2018 23     QRS Axis 03/09/2018 70     T Wave Axis 03/09/2018 46     RBC, UA 03/09/2018 2-4*    WBC, UA 03/09/2018 4-10*    Epithelial Cells 03/09/2018 Moderate*    Bacteria, UA 03/09/2018 Moderate*    TSH 3RD GENERATON 03/10/2018 2 601     Cholesterol 03/10/2018 153     Triglycerides 03/10/2018 61     HDL, Direct 03/10/2018 53     LDL Calculated 03/10/2018 88     RPR 03/10/2018 Non-Reactive     Preg, Serum 03/10/2018 Negative     Sodium 03/10/2018 140     Potassium 03/10/2018 4 1     Chloride 03/10/2018 106     CO2 03/10/2018 28     Anion Gap 03/10/2018 6     BUN 03/10/2018 13     Creatinine 03/10/2018 0 78     Glucose 03/10/2018 88     Glucose, Fasting 03/10/2018 88     Calcium 03/10/2018 7 5*    AST 03/10/2018 15     ALT 03/10/2018 17     Alkaline Phosphatase 03/10/2018 40*    Total Protein 03/10/2018 5 5*    Albumin 03/10/2018 2 7*    Total Bilirubin 03/10/2018 0 20     eGFR 03/10/2018 111     WBC 03/10/2018 7 65     RBC 03/10/2018 3 89     Hemoglobin 03/10/2018 12 1     Hematocrit 03/10/2018 37 0     MCV 03/10/2018 95     MCH 03/10/2018 31 1     MCHC 03/10/2018 32 7     RDW 03/10/2018 12 4     MPV 03/10/2018 9 2     Platelets 26/94/3371 270     Neutrophils Relative 03/10/2018 58     Lymphocytes Relative 03/10/2018 29     Monocytes Relative 03/10/2018 9     Eosinophils Relative 03/10/2018 3     Basophils Relative 03/10/2018 1     Neutrophils Absolute 03/10/2018 4 47     Lymphocytes Absolute 03/10/2018 2 25     Monocytes Absolute 03/10/2018 0 70     Eosinophils Absolute 03/10/2018 0 19     Basophils Absolute 03/10/2018 0 04     Lithium Lvl 03/12/2018 0 5     Sodium 03/12/2018 139     Potassium 03/12/2018 3 7     Chloride 03/12/2018 104     CO2 03/12/2018 26     Anion Gap 03/12/2018 9     BUN 03/12/2018 14     Creatinine 03/12/2018 0 79     Glucose 03/12/2018 77     Glucose, Fasting 03/12/2018 77     Calcium 03/12/2018 8 4     AST 03/12/2018 18     ALT 03/12/2018 17     Alkaline Phosphatase 03/12/2018 44*    Total Protein 03/12/2018 5 8*    Albumin 03/12/2018 2 8*    Total Bilirubin 03/12/2018 0 10*    eGFR 03/12/2018 109        Psychiatric Review of Systems:  Behavior over the last 24 hours:  Slow improvement  Sleep: normal  Appetite: normal  Medication side effects: No  ROS: no complaints    Mental Status Evaluation:  Appearance:  casually dressed   Behavior:  guarded   Speech:  loud   Mood:  angry, anxious and depressed   Affect:  increased in range   Language naming objects and repeating phrases   Thought Process:  circumstantial   Thought Content:  obsessions   Perceptual Disturbances: None   Risk Potential: Suicidal Ideations without plan, Homicidal Ideations none and Potential for Aggression No   Sensorium:  person and place   Cognition:  grossly intact   Consciousness:  awake    Attention: attention span appeared shorter than expected for age   Insight:  limited   Judgment: limited   Intellect fair   Gait/Station: normal gait/station   Motor Activity: no abnormal movements     Progress Toward Goals: slow progress    Recommended Treatment:   Increase lithium to 750 mg daily after dinner for mood stabilization  Continue with group therapy, milieu therapy and occupational therapy      Continue following current medications:   Current Facility-Administered Medications:  acetaminophen 650 mg Oral Q6H PRN Conner Feldman   al mag oxide-diphenhydramine-lidocaine viscous 10 mL Swish & Swallow Q6H PRN JAYLEEN Madera   aluminum-magnesium hydroxide-simethicone 30 mL Oral Q4H PRN Conner Feldman   benztropine 1 mg Intramuscular Q6H PRN Conner Feldman   benztropine 1 mg Oral Q6H PRN Conner Feldman   diphenhydrAMINE 25 mg Oral Q6H PRN JAYLEEN Osman   haloperidol 5 mg Oral Q8H PRN Conner Feldman   haloperidol lactate 5 mg Intramuscular Q6H PRN Conner Feldman   hydrocortisone  Topical 4x Daily PRN Lorenza Ramirez PA-C   hydrOXYzine HCL 50 mg Oral Q8H PRN Conner Feldman   lithium carbonate 750 mg Oral After Dinner Conner Feldman   LORazepam 2 mg Intramuscular Q6H PRN Conner Feldman   LORazepam 1 mg Oral Q8H PRN Conner Feldman   magnesium hydroxide 30 mL Oral Daily PRN Conner Feldman   OLANZapine 5 mg Intramuscular Q8H PRN Conner Feldman   OLANZapine 5 mg Oral Q8H PRN Conner Feldman   venlafaxine 75 mg Oral Daily Conner Feldman   zolpidem 5 mg Oral HS PRN Conner Feldman       Risks, benefits and possible side effects of Medications:   Risks, benefits, and possible side effects of medications explained to patient and patient verbalizes understanding  Risks of medications in pregnancy explained if female patient  Patient verbalizes understanding and agrees to notify her doctor if she becomes pregnant  This note has been constructed using a voice recognition system  There may be translation, syntax,  or grammatical errors  If you have any questions, please contact the dictating provider

## 2018-03-12 NOTE — CASE MANAGEMENT
CM instructed by Andrae NESBITT [facility] [place] ) that no therapy appointment is available until April 12th   advised that pt is able to go to a walkin appointment between the hours of 1-3 on Tuesdays and 10-12 on Thursdays  CM added this information to pt's dc instructions

## 2018-03-12 NOTE — CASE MANAGEMENT
CM spoke with pt who is agreeable to DBT therapy and would prefer this over partial hospitalization program  CM spoke to Nancy Harkins ([phone] ext [phone])who heads up the [facility] [employer] program and has a therapist based out of [facility]  The program involves going to 2 hour groups and also includes having a personal therapist, too  CM is working on finding out transportation options for pt as she does not drive  CM placed a call to [employer] who offer transportation to appointments for individuals on medical assistance  Pt would be eligible for the door to door service as there is not bus service to [facility]  CM will fill out the paper work and continue to follow

## 2018-03-12 NOTE — CASE MANAGEMENT
CM spoke to Olam Nyhan ([phone] ext [phone]) who provided an appointment with Raul Brown who is a DBT therapist in [facility]  Radha Fonseca is going to be on vacation until the end of March and her first available appointment is 4/2/18 @ [facility] recommended that the pt goes to a regular therapy appointment until the [address] [place] [address] therapist gets back from vacation  CM added this appointment to pt's dc instructions

## 2018-03-12 NOTE — PROGRESS NOTES
Given hydrocortisone cream 1% prn, along with atarax 50 mg po prn rash to arms/legs bilaterally, as well as anxiety of 6-7  Admits that she may have rash when she gets anxious  Good effect reported & observed within the hr
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
## 2018-03-13 RX ADMIN — LITHIUM CARBONATE 750 MG: 300 CAPSULE, GELATIN COATED ORAL at 18:00

## 2018-03-13 RX ADMIN — HYDROXYZINE HYDROCHLORIDE 50 MG: 50 TABLET, FILM COATED ORAL at 23:51

## 2018-03-13 RX ADMIN — DIPHENHYDRAMINE HCL 25 MG: 25 TABLET ORAL at 15:03

## 2018-03-13 RX ADMIN — VENLAFAXINE HYDROCHLORIDE 75 MG: 75 CAPSULE, EXTENDED RELEASE ORAL at 09:11
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
## 2018-03-13 NOTE — PROGRESS NOTES
Pt remains calm and cooperative  Denies SI/HI, anxiety and depression  Poor sleep  Pt scant in conversation  Social with select peers  Denies any concerns or questions regarding prescribed medications  Will continue to monitor

## 2018-03-14 PROCEDURE — 99232 SBSQ HOSP IP/OBS MODERATE 35: CPT | Performed by: PSYCHIATRY & NEUROLOGY

## 2018-03-14 RX ADMIN — LITHIUM CARBONATE 750 MG: 300 CAPSULE, GELATIN COATED ORAL at 17:47

## 2018-03-14 RX ADMIN — HYDROXYZINE HYDROCHLORIDE 50 MG: 50 TABLET, FILM COATED ORAL at 22:11

## 2018-03-14 RX ADMIN — VENLAFAXINE HYDROCHLORIDE 75 MG: 75 CAPSULE, EXTENDED RELEASE ORAL at 08:59

## 2018-03-14 NOTE — PROGRESS NOTES
Pt pleasant during interaction  Stated that she had trouble sleeping last night d/t nightmares  seclusive to room resting throughout morning  Pt denies SI  Reports improved depression and anxiety  Reports doing well on current medicines  Pt said that she declined additional medication for complaint of nightmares because she doesn't want to be on too many medicines  Pt said she is being discharged tomorrow and plans to see therapist to stay well after discharge

## 2018-03-14 NOTE — PROGRESS NOTES
Pt reporting symptoms she was having earlier were relieved when she drank water  Pt requested and received atarax 50 mg at 2351  Reports anxiety related to wanting to leave to get things done outside of the hospital  Pt appears to be sleeping within the hour  Med effective

## 2018-03-14 NOTE — DISCHARGE INSTR - OTHER ORDERS
[address] CRISIS AND RESOURCE INFORMATION:   If you are experiencing a mental health emergency that requires immediate attention, please contact Crisis Intervention at  [phone]      Drea Bobo Contact Information:  [facility]  [address] [facility] [address]  Phone: [phone]  Hours: Monday - Friday 8:30 a m  - 5:00 p m    Mental Health Case Management  Phone: [phone]  Fax: [phone]  Location:   [facility] Cait Griffith, [facility] Nando Mcduffie

## 2018-03-14 NOTE — CASE MANAGEMENT
CM met with Pt and discussed discharge planning  Pt verbalized readiness for discharge, denies SI and HI and reports discharge is scheduled for 3/15/18  Pt reports she will return to live with her friends Ofelia Queen and Elsy Campo (Sukhwinder)) upon [facility] [address] in [place]  and her mother will provide discharge transportation  CM contacted Pt's mother Christopher Mcclure # [phone]) and confirmed that she can provide discharge transport and will pick Pt up on 3/15/18 @ 3:00 PM  CM reviewed with Pt outpatient mental health follow-up with DBT Therapist on 4/2/18 for ongoing DBT treatment to promote continued recovery and prevent rehospitalization  CM reinforced the importance of continued med compliance after discharge to promote stabilization of symptoms, promote/enhance ability to manage her mood and prevent rehospitalization and Pt verbalized understanding of same  CM also discussed follow-up for outpatient mental health intake for counseling and psych med management @ [facility] in [place], [address] so that Pt can have psych evaluation to receive outpatient med management after discharge and Pt agreed to utilize walk-in hours as provided in AVS with first walk-in appointment on 3/20/18  Pt provided with mental health crisis and contact info for [facility] in [facility] in preparation for discharge

## 2018-03-14 NOTE — PROGRESS NOTES
Progress Note - Behavioral Health   Chari Jamee Cordova 21 y o  female MRN: [id number]  Unit/Bed#: [facility] 263-01 Encounter: [id number]    Assessment/Plan   Principal Problem:    Bipolar affective disorder, currently depressed, moderate ([address] )  Active Problems:    Generalized anxiety disorder      Subjective:  Patient seclusive to room today  Reported poor sleep due to nightmares last night  Not candidate for Prazosin due to baseline low-end normotensive blood pressure  Last time she took it she passed out  Reports liking Hydroxyzine for anxiety with effective relief  Also, reports effective for initiating sleep  Reports more stable mood and is not labile or irritable  Does not endorse criteria for riki at this time  Was circumstantial in conversation  Depressive symptoms are decreased with improvements in self esteem, motivation, and improvement in appetite  She denied suicidal ideations today  Denied psychotic symptoms  Is medication compliant  Tolerating medications well without serious side effects  Is due for Lithium level tomorrow  Lithium level cannot be taken earlier due to Lithium related half life factors  If level is supratherapeutic patient will require dosing reduction      Current Medications:  Current Facility-Administered Medications   Medication Dose Route Frequency    acetaminophen (TYLENOL) tablet 650 mg  650 mg Oral Q6H PRN    al mag oxide-diphenhydramine-lidocaine viscous (MAGIC MOUTHWASH) suspension 10 mL  10 mL Swish & Swallow Q6H PRN    aluminum-magnesium hydroxide-simethicone (MYLANTA) 200-200-20 mg/5 mL oral suspension 30 mL  30 mL Oral Q4H PRN    benztropine (COGENTIN) injection 1 mg  1 mg Intramuscular Q6H PRN    benztropine (COGENTIN) tablet 1 mg  1 mg Oral Q6H PRN    diphenhydrAMINE (BENADRYL) tablet 25 mg  25 mg Oral Q6H PRN    haloperidol (HALDOL) tablet 5 mg  5 mg Oral Q8H PRN    haloperidol lactate (HALDOL) injection 5 mg  5 mg Intramuscular Q6H PRN    hydrocortisone 1 % cream   Topical 4x Daily PRN    hydrOXYzine HCL (ATARAX) tablet 50 mg  50 mg Oral Q8H PRN    lithium carbonate capsule 750 mg  750 mg Oral After Dinner    LORazepam (ATIVAN) 2 mg/mL injection 2 mg  2 mg Intramuscular Q6H PRN    LORazepam (ATIVAN) tablet 1 mg  1 mg Oral Q8H PRN    magnesium hydroxide (MILK OF MAGNESIA) 400 mg/5 mL oral suspension 30 mL  30 mL Oral Daily PRN    OLANZapine (ZyPREXA) IM injection 5 mg  5 mg Intramuscular Q8H PRN    OLANZapine (ZyPREXA) tablet 5 mg  5 mg Oral Q8H PRN    venlafaxine (EFFEXOR-XR) 24 hr capsule 75 mg  75 mg Oral Daily    zolpidem (AMBIEN) tablet 5 mg  5 mg Oral HS PRN       Behavioral Health Medications: all current active meds have been reviewed and continue current psychiatric medications  Vitals:  Vitals:    03/14/18 0733   BP: 108/54   Pulse: 85   Resp: 18   Temp: 98 6 °F (37 °C)   SpO2:        Laboratory results:    I have personally reviewed all pertinent laboratory/tests results    Most Recent Labs:   Lab Results   Component Value Date    WBC 7 65 03/10/2018    RBC 3 89 03/10/2018    HGB 12 1 03/10/2018    HCT 37 0 03/10/2018     03/10/2018    RDW 12 4 03/10/2018    NEUTROABS 4 47 03/10/2018     03/12/2018    K 3 7 03/12/2018     03/12/2018    CO2 26 03/12/2018    BUN 14 03/12/2018    CREATININE 0 79 03/12/2018    GLUCOSE 77 03/12/2018    CALCIUM 8 4 03/12/2018    AST 18 03/12/2018    ALT 17 03/12/2018    ALKPHOS 44 (L) 03/12/2018    PROT 5 8 (L) 03/12/2018    BILITOT 0 10 (L) 03/12/2018    CHOL 153 03/10/2018    HDL 53 03/10/2018    TRIG 61 03/10/2018    LDLCALC 88 03/10/2018    LITHIUM 0 5 03/12/2018    [id number] 2 601 03/10/2018    PREGSERUM Negative 03/10/2018    RPR Non-Reactive 03/10/2018       Psychiatric Review of Systems:  Behavior over the last 24 hours:  Slight improvement  Sleep: poor  Appetite: normal  Medication side effects: No  ROS: no complaints    Mental Status Evaluation:  Appearance:  casually dressed   Behavior:  guarded   Speech:  normal pitch and normal volume   Mood:  less depressed   Affect:  mood-congruent   Language appropriate   Thought Process:  circumstantial   Thought Content:  obsessions   Perceptual Disturbances: None   Risk Potential: Denied SI/HI  Potential for aggression: No   Sensorium:  person, place and time/date   Cognition:  grossly intact   Consciousness:  alert and awake    Attention: attention span appeared shorter than expected for age   Insight:  partial   Judgment: partial   Gait/Station: normal gait/station and normal balance   Motor Activity: no abnormal movements     Progress Toward Goals: progressing slowly    Recommended Treatment: Continue with group therapy, milieu therapy and occupational therapy  1   Repeat Lithium level and CMP tomorrow  2  Continue other medications   3  Disposition planning    Risks, benefits and possible side effects of Medications:   Risks, benefits, and possible side effects of medications explained to patient and patient verbalizes understanding        Jessica Mohr PA-C

## 2018-03-14 NOTE — PROGRESS NOTES
Pt came to staff reporting that she felt like she was getting sick (with a smile on her face)  Reported symptoms of hot and cold sweats, nausea and being bloated  Pt did not appear diaphoretic  Denied vomiting  Denied a cough  Temp 98 7  Encouraged fluids and to let staff know if symptoms worsen  Pt seen out in the milieu  Denies SI  Denied any other complaints  Will continue to monitor

## 2018-03-14 NOTE — PLAN OF CARE
Problem: DISCHARGE PLANNING  Goal: Discharge to home or other facility with appropriate resources  INTERVENTIONS:  - Identify barriers to discharge w/patient and caregiver  - Arrange for needed discharge resources and transportation as appropriate  - Identify discharge learning needs (meds, wound care, etc )  - Arrange for interpretive services to assist at discharge as needed  - Refer to Case Management Department for coordinating discharge planning if the patient needs post-hospital services based on physician/advanced practitioner order or complex needs related to functional status, cognitive ability, or social support system   Outcome: Completed Date Met: 03/14/18  Discharge scheduled for 3/15/18 to return to live with friends in [place], [place] and mother will provide discharge transportation @ 3:00 PM    Pt will follow-up with [facility] for walk-in assessment for outpatient intake in order to receive outpatient counseling and psychiatry evaluation for psych med management  Pt has DBT initial appointment scheduled for 4/2/18 to receive ongoing DBT with therapist    TEJAS reviewed discharge planning with Pt and Pt verbalized agreement and understanding of discharge plan of care

## 2018-03-14 NOTE — PLAN OF CARE
Problem: Risk for Self Injury/Neglect  Goal: Verbalize thoughts and feelings  Interventions:  - Assess and re-assess patient's lethality and potential for self-injury  - Engage patient in 1:1 interactions, daily, for a minimum of 15 minutes  - Encourage patient to express feelings, fears, frustrations, hopes  - Establish rapport/trust with patient    Outcome: Progressing    Goal: Refrain from harming self  Interventions:  - Monitor patient closely, per order  - Develop a trusting relationship  - Supervise medication ingestion, monitor effects and side effects    Outcome: Progressing    Goal: Recognize maladaptive responses and adopt new coping mechanisms  Outcome: Progressing    Goal: Complete daily ADLs, including personal hygiene independently, as able  Interventions:  - Observe, teach, and assist patient with ADLS  - Monitor and promote a balance of rest/activity, with adequate nutrition and elimination   Outcome: Progressing      Problem: Depression  Goal: Verbalize thoughts and feelings  Interventions:  - Assess and re-assess patient's level of risk   - Engage patient in 1:1 interactions, daily, for a minimum of 15 minutes   - Encourage patient to express feelings, fears, frustrations, hopes    Outcome: Progressing    Goal: Refrain from harming self  Interventions:  - Monitor patient closely, per order   - Supervise medication ingestion, monitor effects and side effects    Outcome: Progressing

## 2018-03-14 NOTE — PROGRESS NOTES
Pt resting in bed at times throughout afternoon/evening  Pleasant during interaction  Continues to report feeling ready for discharge tomorrow  Denies SI and reports feeling better since admission  No concerns at this time

## 2018-03-15 VITALS
WEIGHT: 100.1 LBS | BODY MASS INDEX: 18.9 KG/M2 | RESPIRATION RATE: 18 BRPM | DIASTOLIC BLOOD PRESSURE: 71 MMHG | TEMPERATURE: 98.4 F | HEIGHT: 61 IN | HEART RATE: 89 BPM | OXYGEN SATURATION: 98 % | SYSTOLIC BLOOD PRESSURE: 121 MMHG

## 2018-03-15 LAB
ALBUMIN SERPL BCP-MCNC: 2.8 G/DL (ref 3.5–5)
ALP SERPL-CCNC: 45 U/L (ref 46–384)
ALT SERPL W P-5'-P-CCNC: 19 U/L (ref 12–78)
ANION GAP SERPL CALCULATED.3IONS-SCNC: 5 MMOL/L (ref 4–13)
AST SERPL W P-5'-P-CCNC: 17 U/L (ref 5–45)
BILIRUB SERPL-MCNC: 0.1 MG/DL (ref 0.2–1)
BUN SERPL-MCNC: 14 MG/DL (ref 5–25)
CALCIUM SERPL-MCNC: 8.4 MG/DL (ref 8.3–10.1)
CHLORIDE SERPL-SCNC: 105 MMOL/L (ref 100–108)
CO2 SERPL-SCNC: 28 MMOL/L (ref 21–32)
CREAT SERPL-MCNC: 0.78 MG/DL (ref 0.6–1.3)
GFR SERPL CREATININE-BSD FRML MDRD: 111 ML/MIN/1.73SQ M
GLUCOSE P FAST SERPL-MCNC: 82 MG/DL (ref 65–99)
GLUCOSE SERPL-MCNC: 82 MG/DL (ref 65–140)
LITHIUM SERPL-SCNC: 0.6 MMOL/L (ref 0.5–1)
POTASSIUM SERPL-SCNC: 4.2 MMOL/L (ref 3.5–5.3)
PROT SERPL-MCNC: 5.8 G/DL (ref 6.4–8.2)
SODIUM SERPL-SCNC: 138 MMOL/L (ref 136–145)

## 2018-03-15 PROCEDURE — 99239 HOSP IP/OBS DSCHRG MGMT >30: CPT | Performed by: PSYCHIATRY & NEUROLOGY

## 2018-03-15 PROCEDURE — 80178 ASSAY OF LITHIUM: CPT | Performed by: PSYCHIATRY & NEUROLOGY

## 2018-03-15 PROCEDURE — 80053 COMPREHEN METABOLIC PANEL: CPT | Performed by: PSYCHIATRY & NEUROLOGY

## 2018-03-15 RX ORDER — VENLAFAXINE HYDROCHLORIDE 75 MG/1
75 CAPSULE, EXTENDED RELEASE ORAL DAILY
Qty: 14 CAPSULE | Refills: 3 | Status: SHIPPED | OUTPATIENT
Start: 2018-03-15

## 2018-03-15 RX ORDER — LITHIUM CARBONATE 150 MG/1
CAPSULE ORAL
Qty: 14 CAPSULE | Refills: 3 | Status: SHIPPED | OUTPATIENT
Start: 2018-03-15

## 2018-03-15 RX ORDER — LITHIUM CARBONATE 600 MG/1
CAPSULE ORAL
Qty: 14 CAPSULE | Refills: 3 | Status: SHIPPED | OUTPATIENT
Start: 2018-03-15 | End: 2018-03-15

## 2018-03-15 RX ORDER — LITHIUM CARBONATE 150 MG/1
CAPSULE ORAL
Qty: 14 CAPSULE | Refills: 3 | Status: SHIPPED | OUTPATIENT
Start: 2018-03-15 | End: 2018-03-15

## 2018-03-15 RX ORDER — LITHIUM CARBONATE 600 MG/1
CAPSULE ORAL
Qty: 14 CAPSULE | Refills: 3 | Status: SHIPPED | OUTPATIENT
Start: 2018-03-15

## 2018-03-15 RX ORDER — HYDROXYZINE 50 MG/1
50 TABLET, FILM COATED ORAL EVERY 8 HOURS PRN
Qty: 14 TABLET | Refills: 3 | Status: SHIPPED | OUTPATIENT
Start: 2018-03-15

## 2018-03-15 RX ADMIN — VENLAFAXINE HYDROCHLORIDE 75 MG: 75 CAPSULE, EXTENDED RELEASE ORAL at 10:54
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
## 2018-03-15 NOTE — PROGRESS NOTES
Pt pleasant during interaction  Reports feeling ready for discharge today  Denies SI and contracts for safety  Reports improved depression and anxiety  Visible in milieu and attending groups throughout morning  Discharge instructions reviewed  Pt denies any questions/concerns at this time

## 2018-03-15 NOTE — DISCHARGE INSTRUCTIONS
Bipolar Disorder   WHAT YOU NEED TO KNOW:   Bipolar disorder is a long-term chemical imbalance that causes rapid changes in mood and behavior  High moods are called riki  Low moods are called depression  Sometimes you will feel manic and sometimes you will feel depressed  You can have alternating episodes of riki and depression  This is called a mixed bipolar state  DISCHARGE INSTRUCTIONS:   Call 911 if:   · You think about hurting yourself or someone else  Contact your healthcare provider or psychiatrist if:   · You are having trouble managing your bipolar disorder  · You cannot sleep, or are sleeping all the time  · You cannot eat, or are eating more than usual     · You feel dizzy or your stomach is upset  · You cannot make it to your next meeting  · You have questions or concerns about your condition or care  Medicines:   · Medicines  may be given to help keep your mood stable, or to help you sleep  Changes in medicine are often needed as your bipolar disorder changes  · Take your medicine as directed  Contact your healthcare provider if you think your medicine is not helping or if you have side effects  Tell him or her if you are allergic to any medicine  Keep a list of the medicines, vitamins, and herbs you take  Include the amounts, and when and why you take them  Bring the list or the pill bottles to follow-up visits  Carry your medicine list with you in case of an emergency  Follow up with your healthcare provider or psychiatrist as directed:  Write down your questions so you remember to ask them during your visits  Manage bipolar disorder:  Watch for triggers of bipolar disorder symptoms, such as stress  Learn new ways to relax, such as deep breathing, to manage your stress  Tell someone if you feel a manic or depressive period might be coming on  Ask a friend or family member to help watch you for bipolar symptoms   Work to develop skills that will help you manage bipolar disorder  You may need to make lifestyle changes  Ask your healthcare provider or psychiatrist for resources  For support and more information:   · [address] [facility]), [address] [facility], [address] [facility], [address]  [place] [address]   Phone: [phone]  Phone: [phone]  Web Address: Melissa tn  · Depression and [address] ([employer])  [address] [place] [address], [facility] , [address]  Phone: [phone]  Web Address: [employer] no  org   © 2017 [facility] Information is for End User's use only and may not be sold, redistributed or otherwise used for commercial purposes  All illustrations and images included in CareNotes® are the copyrighted property of A D A M , [employer]  or Seamus Choi  The above information is an  only  It is not intended as medical advice for individual conditions or treatments  Talk to your doctor, nurse or pharmacist before following any medical regimen to see if it is safe and effective for you  Generalized Anxiety Disorder   WHAT YOU NEED TO KNOW:   Generalized anxiety disorder (BALJIT) is a condition that causes you to feel worried or nervous for at least 6 months  The anxiety may be much more severe than the event causing it  You may not be able to do your daily activities because of the anxiety  DISCHARGE INSTRUCTIONS:   Call 911 for any of the following:   · You feel like hurting yourself or someone else  · You have chest pain, tightness, or heaviness that may spread to your shoulders, arms, jaw, neck, or back  Seek care immediately if:   · You feel like fainting or are lightheaded or too dizzy to stand up  Contact your healthcare provider if:   · You have new symptoms since your last visit  · Your anxiety keeps you from doing your daily activities, such as self-care, family, or work      · You have problems that you think may be caused by the medicine you are taking  · Your symptoms are getting worse  · You have questions or concerns about your condition or care  Medicines:   · Medicines  may be given to relieve anxiety and depression and help you feel calm and relaxed  · Take your medicine as directed  Contact your healthcare provider if you think your medicine is not helping or if you have side effects  Tell him or her if you are allergic to any medicine  Keep a list of the medicines, vitamins, and herbs you take  Include the amounts, and when and why you take them  Bring the list or the pill bottles to follow-up visits  Carry your medicine list with you in case of an emergency  Follow up with your healthcare provider as directed:  Write down your questions so you remember to ask them during your visits  Monitor your condition:  Keep a record of the situations that cause your symptoms  Bring the record with you when you see your healthcare provider  Include the following:  · What were you doing when the symptoms started? · Had you eaten anything unusual, or taken a new medicine or herbal supplement? · Were you stressed or upset during the time leading up to the attack? · How often do you have symptoms? How long do they last?    · What were your thoughts and feelings during these situations? · What symptoms did you have? · Did anything help ease or stop the symptoms, such as a relaxation technique? Self-care:   · Limit or do not drink caffeine  Caffeine can increase your heartbeat and make your anxiety symptoms worse  · Limit or do not drink alcohol  Ask your healthcare provider if alcohol is safe for you  You may not be able to drink alcohol if you take certain anxiety or depression medicines  Alcohol can also increase depression  Limit alcohol to 1 drink per day if you are a woman  Limit alcohol to 2 drinks per day if you are a man   A drink of alcohol is 12 ounces of beer, 5 ounces of wine, or 1½ ounces of liquor  · Manage stress  Stress may increase symptoms of BALJIT  Relaxation therapy teaches you how to feel less physical and emotional stress  Deep breathing, muscle relaxation, and music are some forms of relaxation therapy  · Avoid hyperventilating  Some people may hyperventilate during an anxiety attack and not even notice it  Hyperventilation means that your breaths are too fast and shallow  Breathing this way can cause numbness or tingling in your hands and lips  During an anxiety attack, focus on taking very slow, deep breaths  Your healthcare provider may show you how to breathe in and out of a paper bag when you hyperventilate  Never use a plastic bag  © 2017 [address] is for End User's use only and may not be sold, redistributed or otherwise used for commercial purposes  All illustrations and images included in CareNotes® are the copyrighted property of A D A M , Inc  or Seamus Choi  The above information is an  only  It is not intended as medical advice for individual conditions or treatments  Talk to your doctor, nurse or pharmacist before following any medical regimen to see if it is safe and effective for you

## 2018-03-15 NOTE — DISCHARGE INSTR - LAB
Contact Information: If you have any questions, concerns, pended studies, tests and/or procedures, or emergencies regarding your inpatient behavioral health visit  Please contact [place] [facility] [phone] and ask to speak to a , nurse or physician  A contact is available 24 hours/ 7 days a week at this number  Summary of Procedures Performed During your Stay:  Below is a list of major procedures performed during your hospital stay and a summary of results:  - No major procedures performed  Pending Studies     None        If studies are pending at discharge, follow up with your PCP and/or referring provider

## 2018-03-15 NOTE — CASE MANAGEMENT
CM placed application form for medical assistance transport in pt's discharge folder and reviewed it with pt  Pt verbalized understanding  TEJAS spoke with [place] ([phone]) to explain the process of dc and explained to her that all the information will be in the pt's white dc folder